# Patient Record
Sex: FEMALE | Race: WHITE | URBAN - METROPOLITAN AREA
[De-identification: names, ages, dates, MRNs, and addresses within clinical notes are randomized per-mention and may not be internally consistent; named-entity substitution may affect disease eponyms.]

---

## 2017-01-10 ENCOUNTER — OFFICE VISIT (OUTPATIENT)
Dept: NEUROLOGY | Facility: CLINIC | Age: 22
End: 2017-01-10

## 2017-01-10 VITALS — SYSTOLIC BLOOD PRESSURE: 108 MMHG | DIASTOLIC BLOOD PRESSURE: 78 MMHG | HEART RATE: 70 BPM

## 2017-01-10 DIAGNOSIS — G56.00 CARPAL TUNNEL SYNDROME, UNSPECIFIED LATERALITY: ICD-10-CM

## 2017-01-10 DIAGNOSIS — G54.0 THORACIC OUTLET SYNDROME: Primary | ICD-10-CM

## 2017-01-10 RX ORDER — NORETHINDRONE ACETATE AND ETHINYL ESTRADIOL 1.5-30(21)
1 KIT ORAL DAILY
COMMUNITY

## 2017-01-10 ASSESSMENT — PAIN SCALES - GENERAL: PAINLEVEL: NO PAIN (0)

## 2017-01-10 NOTE — NURSING NOTE
Chief Complaint   Patient presents with     Consult     weakness and loss of feeling upper extremities bilaterally     Jorge Smallwood, LPN

## 2017-01-10 NOTE — Clinical Note
"1/10/2017       RE: Nathalie Estevez  [address]  [facility] [address]     Dear Colleague,    Thank you for referring your patient, Nathalie Estevez, to the [facility] NEUROLOGY CLINIC at [facility]. Please see a copy of my visit note below.    January 10, 2017      Jasmeet Triplett MD   [facility]    [address] [place] [address]      RE: Nathalie Estevez   MRN: [id number]   : 1995      Dear Dr. Triplett:      Thank you for referring Nathalie Estevez for neurologic consultation on 01/10/2017.  The patient is a 21-year-old woman you were kind enough to refer because of bilateral arm and hand paresthesias as well as weakness.      The patient has developed symptoms involving her arms over the last 2 years.  She described herself as \"losing feeling\" and developing weakness.  She said she has had trouble playing her musical instruments.  She also noted that she cannot  a peanut jar now without almost dropping it.  She awakens at night with \"no feeling in her arms\".  She notes that certain movements also bring it on including typing and playing her instruments and particularly holding them.  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  She practices for hours a day.  The patient has been following with you.  She was diagnosed initially with cubital tunnel syndrome.  She saw you and also was referred to 2 other orthopedists.  One was in the [facility] in [place].  The patient did undergo extensive hand and arm therapy.  She said she was given \"dry needle therapy.\"  She did note this helped but she also noted during that time she stopped typing or markedly reduced her typing.  She has now noted that her arms have gotten worse in the last month.  She said that she notes it particularly if she drives a car and keeps her " arms extended.  The patient does wear a backpack.  She also carries her musical instruments in cases and her musical instruments and her backpack weigh between 15 and 25 pounds apiece.  She has noted diffuse paresthesias into her fingers, but she cannot localize them to one particular finger.  She has also developed some right wrist pain with her symptoms.  The patient has not had any neck pain or radicular symptoms.  She denied Lhermitte sign.  She is able to control her urine and stool.  There is no decreased perirectal or genital sensation and nothing to suggest any sensory issues with her feet.  The patient's weight has been stable.  She has not suffered any hair loss.  She does not have constipation or diarrhea and she does not feel excessively hot or cold.  She has not been diaphoretic.  The patient notes that her right hand which is her dominant hand does seem to be weaker.  She does have a normal diet.  She is not a vegan and she eats fruits and vegetables.  For the last year she has been taking a biotin supplement.  She thinks this is 500 mg.  This is for her nails.  Sometimes she takes vitamin D supplement.  She has not been taking vitamin B6 or zinc supplements.  The patient does not drink or smoke.  She is single.  She is on birth control pills but no other medicines.  She has never had any surgeries.  Her parents are fine.  Her mother came with her today.        ALLERGIES:   The patient is allergic to nystatin, Keflex and penicillin.        The patient did have a normal EMG through a physiatrist in your office on 02/05/2016.  I do have a copy of that study.  I do not have any other records concerning the patient.      Neurologic examination revealed a pleasant young woman.  Her blood pressure was 108/78 with a pulse of 70.  She had normal gait, station, cerebellar testing, muscle stretch reflexes, which were hypoactive but symmetric with normal leg reflexes to reinforcement, plantar stimulation,  strength testing except for the possibly of trace right thenar weakness, cranial nerve examination, superficial and cortical sensory testing are all unremarkable.  She had excellent radial pulses.  The patient did have bilateral positive Adson sign and hyperabduction of each arm, especially on the right, reproduced her symptoms.  This was reinforced by percussion over her supraclavicular spaces.  She had normal range of motion of her neck.  I could not auscultate cervical bruits, and she had a regular cardiac rhythm without gallops or murmurs.      IMPRESSION:   1.  Thoracic outlet syndrome.   2.  Possible carpal tunnel syndrome.      I have recommended the patient return here for formal EMG testing.  I am also going to get special arterial and venous studies for thoracic outlet.  I have asked that she have cervical spine films with apical lordotic views to help rule out cervical ribs.  The patient is going to start exercises for thoracic outlet.  I gave her these exercises to do and did describe how to do them to her and her mother.  I went over the anatomy with them and did review this from a textbook.      Thank you for allowing me to see this patient.      Sincerely yours,      Misael Moreno MD       I spent 1 hour with the patient and her mother today.  Over 50% of the time this involved counseling and coordination of care.  A complete review of medical systems was done and a positive review is listed in the report above.      cc:   Keon Benito, [facility]    [address] [place] [address]                 D: 2017 17:30   T: 2017 09:05   MT: RAJENDRA      Name:     TANYA MARTIN   MRN:      [id number]        Account:      [id number]   :      1995           Service Date: 01/10/2017      Document: [id number]

## 2017-01-17 DIAGNOSIS — G54.0 THORACIC OUTLET SYNDROME: Primary | ICD-10-CM

## 2017-01-17 NOTE — PROGRESS NOTES
"January 10, 2017      Jasmeet Triplett MD   [facility]    [address] [facility] [address] [place] [address]      RE: Nathalie Estevez   MRN: [id number]   : 1995      Dear Dr. Triplett:      Thank you for referring Nathalie Estevez for neurologic consultation on 01/10/2017.  The patient is a 21-year-old woman you were kind enough to refer because of bilateral arm and hand paresthesias as well as weakness.      The patient has developed symptoms involving her arms over the last 2 years.  She described herself as \"losing feeling\" and developing weakness.  She said she has had trouble playing her musical instruments.  She also noted that she cannot  a peanut jar now without almost dropping it.  She awakens at night with \"no feeling in her arms\".  She notes that certain movements also bring it on including typing and playing her instruments and particularly holding them.  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  The patient has been following with you.  She was diagnosed initially with cubital tunnel syndrome.  She saw you and also was referred to 2 other orthopedists.  One was in the [facility] in [place].  The patient did undergo extensive hand and arm therapy.  She said she was given \"dry needle therapy.\"  She did note this helped but she also noted during that time she stopped typing or markedly reduced her typing.  She has now noted that her arms have gotten worse in the last month.  She said that she notes it particularly if she drives a car and keeps her arms extended.  The patient does wear a backpack.  She also carries her musical instruments in cases and her musical instruments and her backpack weigh between 15 and 25 pounds apiece.  She has noted diffuse paresthesias into her fingers, but she cannot localize them to one particular finger.  She has also developed some right " wrist pain with her symptoms.  The patient has not had any neck pain or radicular symptoms.  She denied Lhermitte sign.  She is able to control her urine and stool.  There is no decreased perirectal or genital sensation and nothing to suggest any sensory issues with her feet.  The patient's weight has been stable.  She has not suffered any hair loss.  She does not have constipation or diarrhea and she does not feel excessively hot or cold.  She has not been diaphoretic.  The patient notes that her right hand which is her dominant hand does seem to be weaker.  She does have a normal diet.  She is not a vegan and she eats fruits and vegetables.  For the last year she has been taking a biotin supplement.  She thinks this is 500 mg.  This is for her nails.  Sometimes she takes vitamin D supplement.  She has not been taking vitamin B6 or zinc supplements.  The patient does not drink or smoke.  She is single.  She is on birth control pills but no other medicines.  She has never had any surgeries.  Her parents are fine.  Her mother came with her today.        ALLERGIES:   The patient is allergic to nystatin, Keflex and penicillin.        The patient did have a normal EMG through a physiatrist in your office on 02/05/2016.  I do have a copy of that study.  I do not have any other records concerning the patient.      Neurologic examination revealed a pleasant young woman.  Her blood pressure was 108/78 with a pulse of 70.  She had normal gait, station, cerebellar testing, muscle stretch reflexes, which were hypoactive but symmetric with normal leg reflexes to reinforcement, plantar stimulation, strength testing except for the possibly of trace right thenar weakness, cranial nerve examination, superficial and cortical sensory testing are all unremarkable.  She had excellent radial pulses.  The patient did have bilateral positive Adson sign and hyperabduction of each arm, especially on the right, reproduced her symptoms.  This  was reinforced by percussion over her supraclavicular spaces.  She had normal range of motion of her neck.  I could not auscultate cervical bruits, and she had a regular cardiac rhythm without gallops or murmurs.      IMPRESSION:   1.  Thoracic outlet syndrome.   2.  Possible carpal tunnel syndrome.      I have recommended the patient return here for formal EMG testing.  I am also going to get special arterial and venous studies for thoracic outlet.  I have asked that she have cervical spine films with apical lordotic views to help rule out cervical ribs.  The patient is going to start exercises for thoracic outlet.  I gave her these exercises to do and did describe how to do them to her and her mother.  I went over the anatomy with them and did review this from a textbook.      Thank you for allowing me to see this patient.      Sincerely yours,      Gemma Moreno MD       I spent 1 hour with the patient and her mother today.  Over 50% of the time this involved counseling and coordination of care.  A complete review of medical systems was done and a positive review is listed in the report above.      cc:   Keon Benito, [facility]    [address] [place] [address]         GEMMA MORENO MD             D: 2017 17:30   T: 2017 09:05   MT: RAJENDRA      Name:     TANYA MARTIN   MRN:      [id number]        Account:      [id number]   :      1995           Service Date: 01/10/2017      Document: [id number]

## 2017-03-21 ENCOUNTER — OFFICE VISIT (OUTPATIENT)
Dept: NEUROLOGY | Facility: CLINIC | Age: 22
End: 2017-03-21

## 2017-03-21 DIAGNOSIS — G54.0 THORACIC OUTLET SYNDROME: Primary | ICD-10-CM

## 2017-03-21 NOTE — LETTER
3/21/2017     RE: Tanya Martin  [address]  [facility] [address]     Dear Colleague,    Thank you for referring your patient, Tanya Martin, to the [facility] NEUROLOGY CLINIC at [facility]. Please see a copy of my visit note below.    RE: TANYA MARTIN : 1995   MRN: [id number] ADRIANNE: 2017     REFERRING:   Keon Benito DO, [place]   [address]     INDICATIONS:   Evaluate the right upper extremity for pain, paresthesias and thoracic outlet syndrome.    RIGHT AND LEFT UPPER EXTREMITY EMG EXAMINATION    RIGHT UPPER EXTREMITY      CONDUCTION   VELOCITIES STIM. LATENCY  MS AMPLITUDE   DISTANCE  CM   NCV NORMAL   Right Median Motor Nerve Above Elbow 7.9  9.3 mV      Record  / APB      29.5 AE-W 57 M/sec >50    F-Wave :  27.1 msec Wrist 2.7  10.3 mV             Right Ulnar Motor Nerve Above Elbow 8.3  9.5 mV     Record / ADM      36 AE-W 61 M/sec >50    F-Wave:  29.6 msec Below Elbow    mV          - BE-W - M/sec >50    Wrist 2.4  9.8 mV                  Above Elbow/Erb's Point Unable to give large enough stimulus to obtain response              Right Sensory Median Nerve  3.1  60 micro      Antidromic Record / Digit II                  Right Sensory Ulnar  2.8  60 micro     Antidromic Record / Digit V                           NEEDLE ELECTRODE EXAMINATION (EMG)   MUSCLES   POS. POT. FIBS. FASCIC.   MOTOR UNIT ACTION POTENTIALS     Right 1st Dorsal Interosseous 0 0 0 Normal   Right Pronator Teres 0 0 0 Normal   Right Abductor Pollicis Brevis 0 0 0 Normal   Right Flexor Carpi Ulnaris 0 0 0 Normal   Right Triceps 0 0 0 Normal     CLINICAL SUMMARY:  Nerve conduction studies of the right upper extremity were all within normal limits.  Technically I could not obtain a right ulnar response when stimulating at Erb's point.  Needle examination was unremarkable.       CLINICAL INTERPRETATION:  The electromyographic examination is essentially normal.       Misael Moreno M.D.  Department of Neurology   [facility]    cc  Keon Benito, [facility]  [address] [place]   [address]      LT Uziel M.D.  [facility]    D: 2017 18:25   T: 2017 11:39   MT: AKA    Name:     NATHALIE MARTIN   Document: [id number]                Keon Benito, [facility]   [address] [place] [address]      RE: Nathalie Martin   MRN: [id number]   : 1995      Dear Dr. Benito:      This is in regard to followup on aNthalie Martin.  The patient returned for routine followup today on 2017.  Her chief complaint is that of bilateral arm paresthesias and pain.      The patient did undergo testing I had recommended earlier this year.  She had through [facility] upper extremity ultrasounds involving the veins and arteries.  These were all found to be patent without focal abnormality and there was no ultrasound sonographic vascular findings to suggest thoracic outlet syndrome.  She did undergo x-ray of her cervical spine on 2017.  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  The patient did have on the same date a chest x-ray which showed normal appearance of the ribs and shoulders.  I reviewed the x-rays with Dr. Anselmo Barrett.  The earlier study had been read by Dr. Wang.  He thought that the transverse processes at C7 were probably larger than to be expected.  He did not know if these could clinically cause any issue.  I reviewed the actual films with the patient.  He suggested that she undergo a CT scan of the cervical spine to assess her for cervical ribs and as to whether these were large enough to cause any type of compromise.  I went over with her the anatomy from a textbook and did show her  examples of cervical ribs.  I doubt that these findings on x-ray are significant.  The patient did tell me that she has been trying thoracic outlet exercises I have been giving her, but really does not feel it has really made a difference.  She said that she will go ahead with the testing I have recommended after she is done with school in 2 months.  Depending on those results, I will make other recommendations.  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  She is going to keep in touch with me and call me after she has the CT scan of the cervical spine.  I spent 15 minutes with her beyond the actual EMG test today.      Thank you again for allowing me to see this patient.      Sincerely yours,      Misael Moreno MD                 LT Uziel M.D.   [facility]   [address] [place] [address]       D: 2017 18:29   T: 2017 11:45   MT: RAJENDRA    Name:     TANYA MARTIN   MRN:      [id number]        Account:      [id number]   :      1995           Service Date: 2017    Document: [id number]

## 2017-03-21 NOTE — MR AVS SNAPSHOT
After Visit Summary   3/21/2017    Nathalie Estevez    MRN: [id number]           Patient Information     Date Of Birth          1995        Visit Information        Provider Department      3/21/2017 10:30 AM Misael Moerno MD [facility]        Today's Diagnoses     Thoracic outlet syndrome    -  1       Follow-ups after your visit        Who to contact     Please call your clinic at [phone] to:    Ask questions about your health    Make or cancel appointments    Discuss your medicines    Learn about your test results    Speak to your doctor   If you have compliments or concerns about an experience at your clinic, or if you wish to file a complaint, please contact [facility] Patient Relations at [phone] or email us at [email or web address]         Additional Information About Your Visit        MyChart Information     [employer] is an electronic gateway that provides easy, online access to your medical records. With [employer], you can request a clinic appointment, read your test results, renew a prescription or communicate with your care team.     To sign up for [employer] visit the website at [email or web address]   You will be asked to enter the access code listed below, as well as some personal information. Please follow the directions to create your username and password.     Your access code is: [email or web address]  Expires: 2017  2:24 PM     Your access code will  in 90 days. If you need help or a new code, please contact your [facility] or call [phone] for assistance.        Care EveryWhere ID     This is your Care EveryWhere ID. This could be used by other organizations to access your [facility] medical records  [id number]         Blood Pressure from Last 3 Encounters:   01/10/17 108/78    Weight from Last 3 Encounters:   No data found for Wt               We Performed the Following     NCS Motor w or w/o F-Wave, 3 or 4 (66461)     Needle EMG - 1 Extremity  (5 or more muscles w/ 3or more nerves or 4 or more spinal levels) ([address])        Primary Care Provider Office Phone # Fax #    Keon Benito [phone]       [facility] [address] [address] [facility] [address]        Thank you!     Thank you for choosing [facility] NEUR[facility]Y [facility]INIC  for your care. Our goal is always to provide you with excellent care. Hearing back from our patients is one way we can continue to improve our services. Please take a few minutes to complete the written survey that you may receive in the mail after your visit with us. Thank you!             Your Updated Medication List - Protect others around you: Learn how to safely use, store and throw away your medicines at www.disposemymeds.org.          This list is accurate as of: 3/21/17 11:59 PM.  Always use your most recent med list.                   Brand Name Dispense Instructions for use    norethindrone-ethinyl estradiol-iron 1.5-30 MG-MCG per tablet    MICROGESTIN FE1.5/30     Take 1 tablet by mouth daily

## 2017-03-22 NOTE — PROGRESS NOTES
RE: TANYA MARTIN : 1995   MRN: [id number] ADRIANNE: 2017     REF[place]ING:   [place]   [address]     INDICATIONS:   Evaluate the right upper extremity for pain, paresthesias and thoracic outlet syndrome.    RIGHT AND LEFT UPPER EXTREMITY EMG EXAMINATION    RIGHT UPPER EXTREMITY      CONDUCTION   VELOCITIES STIM. LATENCY  MS AMPLITUDE   DISTANCE  CM   NCV NORMAL   Right Median Motor Nerve Above Elbow 7.9  9.3 mV      Record  / APB      29.5 AE-W 57 M/sec >50    F-Wave :  27.1 msec Wrist 2.7  10.3 mV             Right Ulnar Motor Nerve Above Elbow 8.3  9.5 mV     Record / ADM      36 AE-W 61 M/sec >50    F-Wave:  29.6 msec Below Elbow    mV          - BE-W - M/sec >50    Wrist 2.4  9.8 mV                  Above Elbow/Erb's Point Unable to give large enough stimulus to obtain response              Right Sensory Median Nerve  3.1  60 micro      Antidromic Record / Digit II                  Right Sensory Ulnar  2.8  60 micro     Antidromic Record / Digit V                           NEEDLE ELECTRODE EXAMINATION (EMG)   MUSCLES   POS. POT. FIBS. FASCIC.   MOTOR UNIT ACTION POTENTIALS     Right 1st Dorsal Interosseous 0 0 0 Normal   Right Pronator Teres 0 0 0 Normal   Right Abductor Pollicis Brevis 0 0 0 Normal   Right Flexor Carpi Ulnaris 0 0 0 Normal   Right Triceps 0 0 0 Normal     CLINICAL SUMMARY:  Nerve conduction studies of the right upper extremity were all within normal limits.  Technically I could not obtain a right ulnar response when stimulating at Erb's point.  Needle examination was unremarkable.      CLINICAL INTERPRETATION:  The electromyographic examination is essentially normal.       Misael Moreno M.D.  Department of Neurology   [facility]    cc  Keon Benito DO  [facility]  [address] [place]   [address]      LT Uziel M.D.  [facility]    D: 2017 18:25   T: 2017 11:39   MT: RAJENDRA    Name:     TANYA MARTIN   MRN:      [id number]        Account:      [id number]   :      1995           Service Date: 2017    Document: [id number]

## 2017-03-22 NOTE — PROGRESS NOTES
Keon Benito,    [facility]   [address] [place] [address]      RE: Nathalie Estevez   MRN: [id number]   : 1995      Dear Dr. Benito:      This is in regard to followup on Nathalie Estevez.  The patient returned for routine followup today on 2017.  Her chief complaint is that of bilateral arm paresthesias and pain.      The patient did undergo testing I had recommended earlier this year.  She had through [facility] upper extremity ultrasounds involving the veins and arteries.  These were all found to be patent without focal abnormality and there was no ultrasound sonographic vascular findings to suggest thoracic outlet syndrome.  She did undergo x-ray of her cervical spine on 2017.  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  The patient did have on the same date a chest x-ray which showed normal appearance of the ribs and shoulders.  I reviewed the x-rays with Dr. Anselmo Barrett.  The earlier study had been read by Dr. Wang.  He thought that the transverse processes at C7 were probably larger than to be expected.  He did not know if these could clinically cause any issue.  I reviewed the actual films with the patient.  He suggested that she undergo a CT scan of the cervical spine to assess her for cervical ribs and as to whether these were large enough to cause any type of compromise.  I went over with her the anatomy from a textbook and did show her examples of cervical ribs.  I doubt that these findings on x-ray are significant.  The patient did tell me that she has been trying thoracic outlet exercises I have been giving her, but really does not feel it has really made a difference.  She said that she will go ahead with the testing I have recommended after she is done with school in 2 months.  Depending on those results, I will make other recommendations.  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  She is going to keep in touch with me and call me after she has the CT scan of the cervical spine.  I spent 15 minutes with her beyond the actual EMG test today.      Thank you again for allowing me to see this patient.      Sincerely yours,      MD GEMMA Louise MD     CC   REESE Triplett   [facility]            D: 2017 18:29   T: 2017 11:45   MT: [id number]      Name:     TANYA MARTIN   MRN:      [id number]        Account:      [id number]   :      1995           Service Date: 2017      Document: [id number]